# Patient Record
Sex: FEMALE | Race: BLACK OR AFRICAN AMERICAN | URBAN - METROPOLITAN AREA
[De-identification: names, ages, dates, MRNs, and addresses within clinical notes are randomized per-mention and may not be internally consistent; named-entity substitution may affect disease eponyms.]

---

## 2017-01-23 ENCOUNTER — TELEPHONE (OUTPATIENT)
Dept: FAMILY MEDICINE CLINIC | Age: 47
End: 2017-01-23

## 2017-01-24 NOTE — TELEPHONE ENCOUNTER
Phone message left on phone that there is a medication issue w/ [facility] and [facility] office number left for a return call.

## 2017-01-25 NOTE — TELEPHONE ENCOUNTER
RN called patient and advised her that her financial information at [address] [place] [address] has  and she needs to provide current information. She stated that she is working a few hours per week, and also paying her expenses with student loans. RN advised her to bring pay stubs for one month and paperwork regarding her student loans to a [facility] site and this information will be faxed to [facility]. Pt stated that she will call [facility] to ask if she can fax the information herself directly to the Clinic as she would like to expedite the process in order to get her medications as soon as possible. Pt expressed understanding of the above information. Yasmin Jorgensen.

## 2017-06-02 DIAGNOSIS — R06.09 DYSPNEA ON EXERTION: ICD-10-CM

## 2017-06-08 RX ORDER — ALBUTEROL SULFATE 90 UG/1
1 AEROSOL, METERED RESPIRATORY (INHALATION)
Qty: 1 INHALER | Refills: 3 | Status: SHIPPED | OUTPATIENT
Start: 2017-06-08

## 2017-06-08 RX ORDER — FLUTICASONE PROPIONATE AND SALMETEROL 250; 50 UG/1; UG/1
1 POWDER RESPIRATORY (INHALATION) EVERY 12 HOURS
Qty: 1 INHALER | Refills: 5 | Status: SHIPPED | OUTPATIENT
Start: 2017-06-08

## 2017-06-08 NOTE — TELEPHONE ENCOUNTER
Rx refilled. Pt needs to complete financial screening/[facility] enma and f/u appt to see provider since it has been almost a year since LOV.

## 2017-06-08 NOTE — TELEPHONE ENCOUNTER
From: Claudene Junes  To: Oniel Sinclair MD  Sent: 6/2/2017 7:52 PM EDT  Subject: Medication Renewal Request    Original authorizing provider: Quintella Mana, MD Claudene Junes would like a refill of the following medications:  fluticasone-salmeterol (ADVAIR DISKUS) 250-50 mcg/dose diskus inhaler Nikhil Dejesus MD]    Preferred pharmacy: [address] [place] [address] - In addition to the fluticason refill above, I am in need of Ventolin HFA inhalation aerosol.  Sincerely, Sky Nicole [phone]

## 2017-06-08 NOTE — TELEPHONE ENCOUNTER
Spoke to pt. Informed e scripts sent to [facility] today about 12:30p and she noted [facility] has already called her. Discussed renewing [facility] financial screening yearly and she stated she has done this. Encouraged to return for f/u, seen last 6/28/16. Discussed [facility] clinic location on 2nd sat of the month and encouraged to make the line for a provider Sat visit. Discussed providers will not continue to do refills unless she sees medical providers on a regular basis.